# Patient Record
Sex: FEMALE | Race: BLACK OR AFRICAN AMERICAN | Employment: FULL TIME | URBAN - METROPOLITAN AREA
[De-identification: names, ages, dates, MRNs, and addresses within clinical notes are randomized per-mention and may not be internally consistent; named-entity substitution may affect disease eponyms.]

---

## 2020-01-26 ENCOUNTER — HOSPITAL ENCOUNTER (EMERGENCY)
Age: 30
Discharge: HOME | End: 2020-01-26
Attending: EMERGENCY MEDICINE
Payer: MEDICAID

## 2020-01-26 VITALS
DIASTOLIC BLOOD PRESSURE: 82 MMHG | RESPIRATION RATE: 18 BRPM | SYSTOLIC BLOOD PRESSURE: 126 MMHG | TEMPERATURE: 99 F | HEART RATE: 90 BPM | WEIGHT: 197 LBS | HEIGHT: 66 IN | BODY MASS INDEX: 31.66 KG/M2 | OXYGEN SATURATION: 100 %

## 2020-01-26 DIAGNOSIS — Z3A.15 15 WEEKS GESTATION OF PREGNANCY: ICD-10-CM

## 2020-01-26 DIAGNOSIS — H65.00 ACUTE SEROUS OTITIS MEDIA, RECURRENCE NOT SPECIFIED, UNSPECIFIED LATERALITY: Primary | ICD-10-CM

## 2020-01-26 PROCEDURE — 99282 EMERGENCY DEPT VISIT SF MDM: CPT

## 2020-01-26 RX ORDER — AMOXICILLIN 500 MG/1
500 TABLET, FILM COATED ORAL 3 TIMES DAILY
Qty: 30 TAB | Refills: 0 | Status: ON HOLD | OUTPATIENT
Start: 2020-01-26 | End: 2021-12-05

## 2020-01-27 NOTE — DISCHARGE INSTRUCTIONS
Patient Education        Weeks 14 to 25 of Your Pregnancy: Care Instructions  Your Care Instructions    During this time, you may start to \"show,\" so that you look pregnant to people around you. You may also notice some changes in your skin, such as itchy spots on your palms or acne on your face. Your baby is now able to pass urine, and your baby's first stool (meconium) is starting to collect in his or her intestines. Hair is also beginning to grow on your baby's head. At your next visit, between weeks 18 and 20, your doctor may do an ultrasound test. The test allows your doctor to check for certain problems. Your doctor can also tell the sex of your baby. This is a good time to think about whether you want to know whether your baby is a boy or a girl. Talk to your doctor about getting a flu shot to help keep you healthy during your pregnancy. As your pregnancy moves along, it is common to worry or feel anxious. Your body is changing a lot. And you are thinking about giving birth, the health of your baby, and becoming a parent. You can learn to cope with any anxiety and stress you feel. Follow-up care is a key part of your treatment and safety. Be sure to make and go to all appointments, and call your doctor if you are having problems. It's also a good idea to know your test results and keep a list of the medicines you take. How can you care for yourself at home?   Reduce stress    · Ask for help with cooking and housekeeping.     · Figure out who or what causes your stress. Avoid these people or situations as much as possible.     · Relax every day. Taking 10- to 15-minute breaks can make a big difference. Take a walk, listen to music, or take a warm bath.     · Learn relaxation techniques at prenatal or yoga class. Or buy a relaxation tape.     · List your fears about having a baby and becoming a parent. Share the list with someone you trust. Decide which worries are really small, and try to let them go. Exercise    · If you did not exercise much before pregnancy, start slowly. Walking is best. [employer], and do a little more every day.     · Brisk walking, easy jogging, low-impact aerobics, water aerobics, and yoga are good choices. Some sports, such as scuba diving, horseback riding, downhill skiing, gymnastics, and water skiing, are not a good idea.     · Try to do at least 2½ hours a week of moderate exercise, such as a fast walk. One way to do this is to be active 30 minutes a day, at least 5 days a week. It's fine to be active in blocks of 10 minutes or more throughout your day and week.     · Wear loose clothing. And wear shoes and a bra that provide good support.     · Warm up and cool down to start and finish your exercise.     · If you want to use weights, be sure to use light weights. They reduce stress on your joints.    Stay at the best weight for you    · Experts recommend that you gain about 1 pound a month during the first 3 months of your pregnancy.     · Experts recommend that you gain about 1 pound a week during your last 6 months of pregnancy, for a total weight gain of 25 to 35 pounds.     · If you are underweight, you will need to gain more weight (about 28 to 40 pounds).     · If you are overweight, you may not need to gain as much weight (about 15 to 25 pounds).     · If you are gaining weight too fast, use common sense. Exercise every day, and limit sweets, fast foods, and fats. Choose lean meats, fruits, and vegetables.     · If you are having twins or more, your doctor may refer you to a dietitian. Where can you learn more? Go to http://vania-mary.info/. Enter Q917 in the search box to learn more about \"Weeks 14 to 18 of Your Pregnancy: Care Instructions. \"  Current as of: May 29, 2019  Content Version: 12.2  © 8956-7249 [employer].  Care instructions adapted under license by [employer] (which disclaims liability or warranty for this information). If you have questions about a medical condition or this instruction, always ask your healthcare professional. [address] any warranty or liability for your use of this information. Middle Ear Fluid: Care Instructions  Your Care Instructions    Fluid often builds up inside the ear during a cold or allergies. Usually the fluid drains away, but sometimes a small tube in the ear, called the eustachian tube, stays blocked for months. Symptoms of fluid buildup may include:  · Popping, ringing, or a feeling of fullness or pressure in the ear. · Trouble hearing. · Balance problems and dizziness. In most cases, you can treat yourself at home. Follow-up care is a key part of your treatment and safety. Be sure to make and go to all appointments, and call your doctor if you are having problems. It's also a good idea to know your test results and keep a list of the medicines you take. How can you care for yourself at home? · In most cases, the fluid clears up within a few months without treatment. You may need more tests if the fluid does not clear up after 3 months. · If your doctor prescribed antibiotics, take them as directed. Do not stop taking them just because you feel better. You need to take the full course of antibiotics. When should you call for help? Call your doctor now or seek immediate medical care if:    · You have symptoms of infection, such as:  ? Increased pain, swelling, warmth, or redness. ? Pus draining from the area. ? A fever.    Watch closely for changes in your health, and be sure to contact your doctor if:    · You notice changes in hearing.     · You do not get better as expected. Where can you learn more? Go to http://vania-mary.info/. Enter L233 in the search box to learn more about \"Middle Ear Fluid: Care Instructions. \"  Current as of: October 21, 2018  Content Version: 12.2  © 7530-7514 [employer]. Care instructions adapted under license by [employer] (which disclaims liability or warranty for this information). If you have questions about a medical condition or this instruction, always ask your healthcare professional. [place] [address] any warranty or liability for your use of this information. Patient Education        Middle Ear Fluid: Care Instructions  Your Care Instructions    Fluid often builds up inside the ear during a cold or allergies. Usually the fluid drains away, but sometimes a small tube in the ear, called the eustachian tube, stays blocked for months. Symptoms of fluid buildup may include:  · Popping, ringing, or a feeling of fullness or pressure in the ear. · Trouble hearing. · Balance problems and dizziness. In most cases, you can treat yourself at home. Follow-up care is a key part of your treatment and safety. Be sure to make and go to all appointments, and call your doctor if you are having problems. It's also a good idea to know your test results and keep a list of the medicines you take. How can you care for yourself at home? · In most cases, the fluid clears up within a few months without treatment. You may need more tests if the fluid does not clear up after 3 months. · If your doctor prescribed antibiotics, take them as directed. Do not stop taking them just because you feel better. You need to take the full course of antibiotics. When should you call for help? Call your doctor now or seek immediate medical care if:    · You have symptoms of infection, such as:  ? Increased pain, swelling, warmth, or redness. ? Pus draining from the area. ? A fever.    Watch closely for changes in your health, and be sure to contact your doctor if:    · You notice changes in hearing.     · You do not get better as expected. Where can you learn more? Go to http://vania-mary.info/.   Enter A433 in the search box to learn more about \"Middle Ear Fluid: Care Instructions. \"  Current as of: October 21, 2018  Content Version: 12.2  © 9356-6438 [employer]. Care instructions adapted under license by [employer] (which disclaims liability or warranty for this information). If you have questions about a medical condition or this instruction, always ask your healthcare professional. [place] [address] any warranty or liability for your use of this information.

## 2020-01-27 NOTE — ED PROVIDER NOTES
HPI patient is a 27-year-old female who presents to the ER with complaint of having bilateral ears congestion and nasal congestion x 2  weeks. Patient is 15 weeks pregnant. No fever chills shortness of breath. History reviewed. No pertinent past medical history. History reviewed. No pertinent surgical history. History reviewed. No pertinent family history. Social History     Socioeconomic History    Marital status: SINGLE     Spouse name: Not on file    Number of children: Not on file    Years of education: Not on file    Highest education level: Not on file   Occupational History    Not on file   Social Needs    Financial resource strain: Not on file    Food insecurity:     Worry: Not on file     Inability: Not on file    Transportation needs:     Medical: Not on file     Non-medical: Not on file   Tobacco Use    Smoking status: Not on file   Substance and Sexual Activity    Alcohol use: Not on file    Drug use: Not on file    Sexual activity: Not on file   Lifestyle    Physical activity:     Days per week: Not on file     Minutes per session: Not on file    Stress: Not on file   Relationships    Social connections:     Talks on phone: Not on file     Gets together: Not on file     Attends Ten[employer] service: Not on file     Active member of club or organization: Not on file     Attends meetings of clubs or organizations: Not on file     Relationship status: Not on file    Intimate partner violence:     Fear of current or ex partner: Not on file     Emotionally abused: Not on file     Physically abused: Not on file     Forced sexual activity: Not on file   Other Topics Concern    Not on file   Social History Narrative    Not on file         ALLERGIES: Patient has no known allergies. Review of Systems   Constitutional: Negative. HENT: Negative. Eyes: Negative. Respiratory: Negative. Cardiovascular: Negative. Gastrointestinal: Negative. Endocrine: Negative. Genitourinary: Negative. Musculoskeletal: Negative. Skin: Negative. Allergic/Immunologic: Negative. Neurological: Negative. Hematological: Negative. Psychiatric/Behavioral: Negative. All other systems reviewed and are negative. Vitals:    01/26/20 2215   BP: 126/82   Pulse: 90   Resp: 18   Temp: 99 °F (37.2 °C)   SpO2: 100%   Weight: 89.4 kg (197 lb)   Height: 5' 6\" (1.676 m)            Physical Exam  Vitals signs and nursing note reviewed. Constitutional:       General: She is not in acute distress. Appearance: She is well-developed. She is not diaphoretic. HENT:      Head: Normocephalic. Comments: RIGHT EAR: TM - dull and erythematous     Right Ear: External ear normal.      Left Ear: Tympanic membrane and external ear normal.      Mouth/Throat:      Pharynx: No oropharyngeal exudate. Eyes:      General: No scleral icterus. Right eye: No discharge. Left eye: No discharge. Conjunctiva/sclera: Conjunctivae normal.      Pupils: Pupils are equal, round, and reactive to light. Neck:      Musculoskeletal: Normal range of motion and neck supple. Thyroid: No thyromegaly. Vascular: No JVD. Trachea: No tracheal deviation. Cardiovascular:      Rate and Rhythm: Normal rate and regular rhythm. Heart sounds: Normal heart sounds. No murmur. No friction rub. No gallop. Pulmonary:      Effort: Pulmonary effort is normal. No respiratory distress. Breath sounds: Normal breath sounds. No stridor. No wheezing or rales. Chest:      Chest wall: No tenderness. Abdominal:      General: Bowel sounds are normal. There is no distension. Palpations: Abdomen is soft. There is no mass. Tenderness: There is no tenderness. There is no guarding or rebound. Comments: Uterus: 15 weeks in size. Musculoskeletal: Normal range of motion. General: No tenderness. Lymphadenopathy:      Cervical: No cervical adenopathy.    Skin: General: Skin is warm and dry. Coloration: Skin is not pale. Findings: No erythema or rash. Neurological:      Mental Status: She is alert and oriented to person, place, and time. Cranial Nerves: No cranial nerve deficit. Motor: No abnormal muscle tone. Coordination: Coordination normal.      Deep Tendon Reflexes: Reflexes normal.        MDM       Procedures    Dif Dx: Otitis media, URI, flu, viral syndrome, pregnancy    Diagnosis: Right otitis media, URI    Disposition: Discharge home    Dictation disclaimer:  Please note that this dictation was completed with [employer], the [employer] voice recognition software. Quite often unanticipated grammatical, syntax, homophones, and other interpretive errors are inadvertently transcribed by the computer software. Please disregard these errors. Please excuse any errors that have escaped final proofreading.

## 2020-01-27 NOTE — ED TRIAGE NOTES
Pt c/o cough and congestion x 2 weeks, pt claimed she was  [facility], not able to get any medicine, pt claimed she's 15 weeks pregnant.

## 2020-07-21 PROBLEM — Z34.93 PREGNANT AND NOT YET DELIVERED IN THIRD TRIMESTER: Status: ACTIVE | Noted: 2020-07-21

## 2020-07-21 PROBLEM — O48.0 POST TERM PREGNANCY OVER 40 WEEKS: Status: ACTIVE | Noted: 2020-07-21

## 2021-12-04 PROBLEM — O48.0 POST TERM PREGNANCY OVER 40 WEEKS: Status: RESOLVED | Noted: 2020-07-21 | Resolved: 2021-12-04

## 2021-12-04 PROBLEM — Z34.90 TERM PREGNANCY, REPEAT: Chronic | Status: ACTIVE | Noted: 2021-12-04
